# Patient Record
Sex: FEMALE | Race: WHITE | NOT HISPANIC OR LATINO | URBAN - METROPOLITAN AREA
[De-identification: names, ages, dates, MRNs, and addresses within clinical notes are randomized per-mention and may not be internally consistent; named-entity substitution may affect disease eponyms.]

---

## 2019-11-07 ENCOUNTER — OUTPATIENT (OUTPATIENT)
Dept: OUTPATIENT SERVICES | Facility: HOSPITAL | Age: 34
LOS: 1 days | Discharge: HOME | End: 2019-11-07

## 2019-11-07 VITALS
RESPIRATION RATE: 16 BRPM | TEMPERATURE: 98 F | HEART RATE: 70 BPM | OXYGEN SATURATION: 100 % | SYSTOLIC BLOOD PRESSURE: 111 MMHG | WEIGHT: 119.05 LBS | HEIGHT: 64 IN | DIASTOLIC BLOOD PRESSURE: 65 MMHG

## 2019-11-07 DIAGNOSIS — Z01.818 ENCOUNTER FOR OTHER PREPROCEDURAL EXAMINATION: ICD-10-CM

## 2019-11-07 LAB
APPEARANCE UR: CLEAR — SIGNIFICANT CHANGE UP
BASOPHILS # BLD AUTO: 0.06 K/UL — SIGNIFICANT CHANGE UP (ref 0–0.2)
BASOPHILS NFR BLD AUTO: 0.8 % — SIGNIFICANT CHANGE UP (ref 0–1)
BILIRUB UR-MCNC: NEGATIVE — SIGNIFICANT CHANGE UP
COLOR SPEC: SIGNIFICANT CHANGE UP
DIFF PNL FLD: NEGATIVE — SIGNIFICANT CHANGE UP
EOSINOPHIL # BLD AUTO: 0.16 K/UL — SIGNIFICANT CHANGE UP (ref 0–0.7)
EOSINOPHIL NFR BLD AUTO: 2.1 % — SIGNIFICANT CHANGE UP (ref 0–8)
GLUCOSE UR QL: NEGATIVE — SIGNIFICANT CHANGE UP
HCT VFR BLD CALC: 39.1 % — SIGNIFICANT CHANGE UP (ref 37–47)
HGB BLD-MCNC: 12.9 G/DL — SIGNIFICANT CHANGE UP (ref 12–16)
IMM GRANULOCYTES NFR BLD AUTO: 0.3 % — SIGNIFICANT CHANGE UP (ref 0.1–0.3)
KETONES UR-MCNC: NEGATIVE — SIGNIFICANT CHANGE UP
LEUKOCYTE ESTERASE UR-ACNC: NEGATIVE — SIGNIFICANT CHANGE UP
LYMPHOCYTES # BLD AUTO: 2.36 K/UL — SIGNIFICANT CHANGE UP (ref 1.2–3.4)
LYMPHOCYTES # BLD AUTO: 31.7 % — SIGNIFICANT CHANGE UP (ref 20.5–51.1)
MCHC RBC-ENTMCNC: 29.6 PG — SIGNIFICANT CHANGE UP (ref 27–31)
MCHC RBC-ENTMCNC: 33 G/DL — SIGNIFICANT CHANGE UP (ref 32–37)
MCV RBC AUTO: 89.7 FL — SIGNIFICANT CHANGE UP (ref 81–99)
MONOCYTES # BLD AUTO: 0.37 K/UL — SIGNIFICANT CHANGE UP (ref 0.1–0.6)
MONOCYTES NFR BLD AUTO: 5 % — SIGNIFICANT CHANGE UP (ref 1.7–9.3)
NEUTROPHILS # BLD AUTO: 4.48 K/UL — SIGNIFICANT CHANGE UP (ref 1.4–6.5)
NEUTROPHILS NFR BLD AUTO: 60.1 % — SIGNIFICANT CHANGE UP (ref 42.2–75.2)
NITRITE UR-MCNC: NEGATIVE — SIGNIFICANT CHANGE UP
NRBC # BLD: 0 /100 WBCS — SIGNIFICANT CHANGE UP (ref 0–0)
PH UR: 7 — SIGNIFICANT CHANGE UP (ref 5–8)
PLATELET # BLD AUTO: 306 K/UL — SIGNIFICANT CHANGE UP (ref 130–400)
PROT UR-MCNC: NEGATIVE — SIGNIFICANT CHANGE UP
RBC # BLD: 4.36 M/UL — SIGNIFICANT CHANGE UP (ref 4.2–5.4)
RBC # FLD: 12 % — SIGNIFICANT CHANGE UP (ref 11.5–14.5)
SP GR SPEC: 1.01 — SIGNIFICANT CHANGE UP (ref 1.01–1.02)
UROBILINOGEN FLD QL: SIGNIFICANT CHANGE UP
WBC # BLD: 7.45 K/UL — SIGNIFICANT CHANGE UP (ref 4.8–10.8)
WBC # FLD AUTO: 7.45 K/UL — SIGNIFICANT CHANGE UP (ref 4.8–10.8)

## 2019-11-07 NOTE — H&P PST ADULT - REASON FOR ADMISSION
32 yo female presents for elective breast augmentation;  denies chest pain, palpitations, shortness of breath, dyspnea, or dysuria. exercise tolerance: 2+ blocks/ flights of stairs w/o sob

## 2019-11-21 ENCOUNTER — OUTPATIENT (OUTPATIENT)
Dept: OUTPATIENT SERVICES | Facility: HOSPITAL | Age: 34
LOS: 1 days | Discharge: HOME | End: 2019-11-21

## 2019-11-21 VITALS
SYSTOLIC BLOOD PRESSURE: 109 MMHG | HEIGHT: 64 IN | RESPIRATION RATE: 18 BRPM | DIASTOLIC BLOOD PRESSURE: 59 MMHG | TEMPERATURE: 99 F | HEART RATE: 74 BPM | WEIGHT: 119.05 LBS | OXYGEN SATURATION: 98 %

## 2019-11-21 VITALS
OXYGEN SATURATION: 97 % | TEMPERATURE: 99 F | RESPIRATION RATE: 20 BRPM | SYSTOLIC BLOOD PRESSURE: 11 MMHG | HEART RATE: 65 BPM | DIASTOLIC BLOOD PRESSURE: 57 MMHG

## 2019-11-21 RX ORDER — ONDANSETRON 8 MG/1
4 TABLET, FILM COATED ORAL ONCE
Refills: 0 | Status: COMPLETED | OUTPATIENT
Start: 2019-11-21 | End: 2019-11-21

## 2019-11-21 RX ORDER — SODIUM CHLORIDE 9 MG/ML
1000 INJECTION, SOLUTION INTRAVENOUS
Refills: 0 | Status: DISCONTINUED | OUTPATIENT
Start: 2019-11-21 | End: 2020-01-09

## 2019-11-21 RX ORDER — CEFAZOLIN SODIUM 1 G
1000 VIAL (EA) INJECTION ONCE
Refills: 0 | Status: COMPLETED | OUTPATIENT
Start: 2019-11-21 | End: 2019-11-21

## 2019-11-21 RX ORDER — OXYCODONE AND ACETAMINOPHEN 5; 325 MG/1; MG/1
1 TABLET ORAL EVERY 4 HOURS
Refills: 0 | Status: DISCONTINUED | OUTPATIENT
Start: 2019-11-21 | End: 2019-11-21

## 2019-11-21 RX ORDER — MORPHINE SULFATE 50 MG/1
2 CAPSULE, EXTENDED RELEASE ORAL
Refills: 0 | Status: DISCONTINUED | OUTPATIENT
Start: 2019-11-21 | End: 2019-11-21

## 2019-11-21 RX ADMIN — Medication 100 MILLIGRAM(S): at 11:18

## 2019-11-21 RX ADMIN — ONDANSETRON 4 MILLIGRAM(S): 8 TABLET, FILM COATED ORAL at 11:29

## 2019-11-21 RX ADMIN — SODIUM CHLORIDE 100 MILLILITER(S): 9 INJECTION, SOLUTION INTRAVENOUS at 10:13

## 2019-11-21 RX ADMIN — OXYCODONE AND ACETAMINOPHEN 1 TABLET(S): 5; 325 TABLET ORAL at 12:09

## 2019-11-21 RX ADMIN — MORPHINE SULFATE 2 MILLIGRAM(S): 50 CAPSULE, EXTENDED RELEASE ORAL at 10:50

## 2019-11-21 RX ADMIN — MORPHINE SULFATE 2 MILLIGRAM(S): 50 CAPSULE, EXTENDED RELEASE ORAL at 10:39

## 2019-11-21 NOTE — BRIEF OPERATIVE NOTE - NSICDXBRIEFPROCEDURE_GEN_ALL_CORE_FT
PROCEDURES:  Augmentation of both breasts with prosthetic implant 21-Nov-2019 10:19:03  Gregorio Meneses

## 2019-11-21 NOTE — ASU DISCHARGE PLAN (ADULT/PEDIATRIC) - ASU DC SPECIAL INSTRUCTIONSFT
You are being discharged from [facility]. Please schedule a follow up appointment with Dr. Otto in 1 week. Please do not raise both arms more than 90 degrees. Please keep surgical bra on at all times. Please avoid heavy weight lifting for the next 4-6 weeks.  If you have any further questions about your care, please do not hesitate to contact Dr. Otto's clinic.

## 2019-11-21 NOTE — CHART NOTE - NSCHARTNOTEFT_GEN_A_CORE
Anesthesia Post Op Assessment  		(    ) Intubated           TV _SV____	Rate _16____	FiO2__NC 3L___  		( x   ) Patent airway. Full return of protective reflexes  		(  x  )Full recovery from anesthesia/sedation to baseline status      Cardiovascular Function:  	  BP:  132./81		             Pulse:  77		             RR:  16		             Temp:  98.4		             O2Sat:  100%      Mental Status:  	        (  x  ) awake		  (    ) alert		 (    ) drowsy	               (    ) sedated      Nausea/Vomiting:  		(    ) Yes, See post-op orders		   ( x   ) No      Pain Scale: (0-10):			Treatment:     (    ) None	            (  x  ) See Post-Op/PCA Orders      Post-operative Fluids: 	   (    ) Oral	          (   x ) See post-op Orders        Comments:    Uneventful. No complications from anesthesia.  Discharge when criteria met.

## 2019-11-21 NOTE — ASU DISCHARGE PLAN (ADULT/PEDIATRIC) - CARE PROVIDER_API CALL
Chad Otto)  Plastic Surgery; Surgery  [facility] [facility], [address] [place] [address]  Phone: [phone]  Fax: [phone]  Follow Up Time: 1 week

## 2019-11-27 DIAGNOSIS — N62 HYPERTROPHY OF BREAST: ICD-10-CM

## 2021-12-17 NOTE — BRIEF OPERATIVE NOTE - NSEVIDENCEINFORABS_GEN_ALL_CORE
MEDICAL SCREENING:    Reason for Visit: Abdominal Pain    Patient initially seen in triage.  The patient was advised further evaluation and diagnostic testing will be needed, some of the treatment and testing will be initiated in the lobby in order to begin the process.  The patient will be returned to the waiting area for the time being and possibly be re-assessed by a subsequent ED provider.  The patient will be brought back to the treatment area in as timely manner as possible.         Ajay Chavez, DO  12/16/21 2219    
No

## 2024-08-14 NOTE — ASU PATIENT PROFILE, ADULT - PSH
29 y.o. female  at 28w1d by Estimated Date of Delivery: 24    Complaints today: none   Denies UCs, LOF, VB. Reports + fetal movement.    Reviewed TW lbs    PHYSICAL EXAM  /68   Pulse 91   Wt 80 kg (176 lb 5.9 oz)   LMP 2024 (Exact Date)   BMI 29.35 kg/m²     GENERAL: No acute distress  HEENT: Normocephalic, atraumatic  NEURO: Alert and oriented x3  PULMONARY: Non-labored respiration; no tachypnea  ABD: Soft, gravid, nontender.    ASSESSMENT AND PLAN  28 weeks gestation of pregnancy    Other orders  -     Tdap Vaccine; Future      Birth Center Risk Assessment: 0- Meets birth center guidelines    CNM management in ABC  CNM management on L&D  Consultation with OB to develop  plan of care  Collaborative CNM/OB management with delivery on L&D  Permanent referral of care to MD   Completing 28wk labs today.   Blood type: (AB POS).   Education regarding CDC recommendations for Tdap in pregnancy. Patient desires at next OB visit. Order placed, will schedule with next appt.    Reviewed warning signs, normal FKCs,  labor precautions and how/when to call.    Follow-up: 2-3 weeks, call or present sooner PRN.    Khalida Billy, ELVIN, APRN     No significant past surgical history